# Patient Record
Sex: MALE | Race: WHITE
[De-identification: names, ages, dates, MRNs, and addresses within clinical notes are randomized per-mention and may not be internally consistent; named-entity substitution may affect disease eponyms.]

---

## 2018-06-26 ENCOUNTER — HOSPITAL ENCOUNTER (OUTPATIENT)
Dept: HOSPITAL 74 - FASU | Age: 58
LOS: 1 days | Discharge: HOME HEALTH | End: 2018-06-27
Attending: ORTHOPAEDIC SURGERY
Payer: COMMERCIAL

## 2018-06-26 VITALS — BODY MASS INDEX: 35.4 KG/M2

## 2018-06-26 DIAGNOSIS — M17.11: Primary | ICD-10-CM

## 2018-06-26 PROCEDURE — 27446 REVISION OF KNEE JOINT: CPT

## 2018-06-26 PROCEDURE — 8E0Y0CZ ROBOTIC ASSISTED PROCEDURE OF LOWER EXTREMITY, OPEN APPROACH: ICD-10-PCS | Performed by: ORTHOPAEDIC SURGERY

## 2018-06-26 PROCEDURE — 8E0YXBZ COMPUTER ASSISTED PROCEDURE OF LOWER EXTREMITY: ICD-10-PCS | Performed by: ORTHOPAEDIC SURGERY

## 2018-06-26 PROCEDURE — 0SRC0L9 REPLACEMENT OF RIGHT KNEE JOINT WITH MEDIAL UNICONDYLAR SYNTHETIC SUBSTITUTE, CEMENTED, OPEN APPROACH: ICD-10-PCS | Performed by: ORTHOPAEDIC SURGERY

## 2018-06-26 PROCEDURE — 20985 CPTR-ASST DIR MS PX: CPT

## 2018-06-26 RX ADMIN — CEFAZOLIN SODIUM SCH MLS/HR: 2 SOLUTION INTRAVENOUS at 23:16

## 2018-06-26 RX ADMIN — ACETAMINOPHEN SCH MG: 325 TABLET ORAL at 23:16

## 2018-06-26 NOTE — HP
[facility]





- Chief Complaint


Chief Complaint: right knee pain





- Past Medical History


Allergies/Adverse Reactions: 


 Allergies











Allergy/AdvReac Type Severity Reaction Status Date / Time


 


No Known Allergies Allergy   Verified 06/06/18 14:56














- Current Medications


Current Medications: 


 Home Medications











 Medication  Instructions  Recorded


 


Oxycodone HCl/Acetaminophen 1 each PO Q4H 06/06/18





[Percocet  mg Tablet]  














Satellite Physical Exam





- Physical Examination


General Appearance: Well Nourished, Well Developed, Alert & Oriented x3


ENT: Clear


Lung: Normal air movement


Heart: Regular rate & rhythm


Extremities: Other (right knee- + swelling, + ttp medially, decr rom, nvi xrays 

show grade 4 medial djd)


Neurological: Intact, Alert, Oriented





Satellite Impression/Plan





- Impression/Plan


Impression: right knee medial djd


Operative Procedure: right medial cindy ukr


Date to be Performed: 06/26/18

## 2018-06-26 NOTE — OP
Operative Note





- Note:


Operative Date: 06/26/18 (rae)


Pre-Operative Diagnosis: right knee medial djd


Operation: right medial cindy ukr


Post-Operative Diagnosis: Same as Pre-op


Surgeon: Mario Sr


Assistant: Viral Tobias)


Anesthesiologist/CRNA: Carolee Pruett


Anesthesia: Spinal, Local


Specimens Removed: bone fragments


Estimated Blood Loss (mls): 100


Operative Report Dictated: Yes

## 2018-06-26 NOTE — SPEC
DATE OF OPERATION:  06/26/2018

 

PREOPERATIVE DIAGNOSIS:  Degenerative joint disease, right knee.

 

POSTOPERATIVE DIAGNOSIS:  Degenerative joint disease, right knee.

 

PROCEDURE:  Right medial unicompartmental knee replacement with robotic-assisted

navigation (MAKOplasty) and patelloplasty.

 

SURGICAL ATTENDING:  Altaf Sr MD

 

FIRST ASSISTANT:  RONA Bob and Chepe Montejo MD

 

ANESTHESIA:  Regional and spinal.

 

CLOSURE:  Medial unicompartmental CHIOMA components with a 5 femur, 6 tibia, and a 9

polyethylene; No. 1 Vicryl, fascia; 0 and 2-0, subcutaneous; 3-0 Monocryl

subcuticular with skin glue; 4-0 undyed Vicryl for pin sites.

 

ESTIMATED BLOOD LOSS:  Less than 100 mL.

 

COMPLICATIONS:  None.

 

CONDITION:  To recovery in stable condition.

 

DESCRIPTION OF OPERATIVE PROCEDURE:  Patient was taken to the operating room on June 26, 2018.  Spinal and regional anesthesia was administered by the anesthesiologist. 

IV Kefzol and TXA were administered prophylactically prior to the case.  A

well-padded pneumatic tourniquet was placed on the right proximal thigh.   The right

lower extremity was prepped and draped in the usual sterile fashion.  A 6- to 8-cm

longitudinal incision over the medial side of the patella from mid patella to the

tibial tubercle was incised and was deepened using Bovie cautery.  An arthrotomy was

then made just medial to the patellar tendon and the patella.  Subperiosteal

dissection was done on the anteromedial proximal tibia all the way back to the MCL. 

Partial fat pad excision was performed, exposing the medial compartment.  Checkpoint

was malleable at both the femur and the tibia.  Using 2 stab incisions in the femur 1

handbreadth above the patella on the femur and 2 stab incisions 1 handbreadth below

the tibial tubercle on the tibia, 2 threaded pins were drilled in parallel fashion

from anterior to posterior, going through the proximal cortex and engaging the 2nd

but not through the 2nd cortex.  To these threaded pins were fastened navigation

rays, 1 on the femur and 1 on the tibia.  The knee was then registered with the

navigation device with the center of the rotation of the hip, medial and lateral

malleoli, and multiple points both on the femur and on the tibia.  Excellent

registration of less than 0.5 mm was obtained on both to ensure adequate

registration.  The navigation device ensured us to "pop the bubbles" both on the

femur and the tibia and that was performed and passed registration.

 

The knee was then thoroughly inspected to remove all osteophytes both on the femur

and the tibia.  Also, osteophytes on the trochlea and on the surface of the patella

were removed as well.  The knee was then stressed with valgus stress at 0, 30, 60,

90, and 120 degrees of flexion.  This propagated a looseness/tightness graft.  The

virtual positions of the components were then optimized to ensure an excellent graft.

 The tracking also was optimized by manipulating the virtual position to ensure that

the femoral component articulated with the central portion of the tibial component. 

The robot was then brought into the field and was registered.  The robot was used to

bur the bone on both the femur and the tibia as to the specifications of the

components.  The trial components were then applied on both the femur and the tibia

with an appropriate polyethylene insert.  The knee was taken through a range of

motion and found to have full extension, full flexion, with excellent stability. 

Stressing the graft revealed an excellent looseness/tightness graft with the trial

components in place.

 

The trial components were removed.  The knee was thoroughly irrigated with a copious

amount of antibiotic irrigation.  The real components were then cemented in using

modern generation cement techniques with antibiotic cement and pressurization.  After

the cement was hardened, the knee was thoroughly inspected to remove out all excess

cement.  The real polyethylene insert was then clipped into place.  Range of motion

and stability were again assessed to be as they were with the trials.  At this time,

the pins and the checkpoints were removed.  The knee was again thoroughly irrigated. 

The arthrotomy was closed with No. 1 Vicryl, 0 and 2-0 subcutaneous, and 3-0 Monocryl

subcuticular with skin glue for the skin, 4-0 undyed Vicryl for the pin sites. 

Sterile pressure dressing was placed over the knee.  Patient awakened from anesthesia

and transferred to recovery in stable condition.  No complications.  Estimated blood

loss negligible.  X-rays postoperatively revealed excellent position of the

components.

 

Chepe Montejo MD dictating for MD ALTAF Martino M.D. [id number]

DD: 06/26/2018 16:47

DT: 06/26/2018 18:12

Job #:  [id number]

## 2018-06-27 VITALS — SYSTOLIC BLOOD PRESSURE: 142 MMHG | TEMPERATURE: 98 F | DIASTOLIC BLOOD PRESSURE: 84 MMHG | HEART RATE: 88 BPM

## 2018-06-27 RX ADMIN — CEFAZOLIN SODIUM SCH MLS/HR: 2 SOLUTION INTRAVENOUS at 06:21

## 2018-06-27 RX ADMIN — ACETAMINOPHEN SCH MG: 325 TABLET ORAL at 06:21

## 2018-06-27 NOTE — DS
Physical Examination


Vital Signs: 


 Vital Signs











Temperature  98.0 F   06/27/18 05:39


 


Pulse Rate  88   06/27/18 05:39


 


Respiratory Rate  18   06/27/18 05:39


 


Blood Pressure  142/84   06/27/18 05:39


 


O2 Sat by Pulse Oximetry (%)  98   06/27/18 05:39














Discharge Summary


Reason For Visit: OSTEOARTHRITIS


Procedures: Principal: right medial cindy ukr


Hospital Course: 





admitted for elective right medial cindy ukr, uneventful post-op, stable for d/c


Condition: Good





- Instructions


Diet, Activity, Other Instructions: 


Post-op Instructions-Partial Knee Replacement                                  

                 





     Call the office for a follow-up  appointment in 1 week - [phone]


     Aspirin 325mg daily for 6 weeks. Pain medication was sent into your 

pharmacy.                      


     Apply  Graduated Compression Stockings (TEDs) to both lower extremities-

remove daily


      for hygiene  ONLY


     Apply Sequential Compression Device (SCDs)  to both Lower extremities 

remove for PT 


      and hygiene ONLY  


     Apply cold packs to affected area for 15 minutes every 2 hours.


     Physical Therapist will come to your home for the first 5 days. You will 

be set up with 


      outpatient PT at your first post-operative visit.


     Patient may ambulate as tolerated-encourage self care (at least every 2-3 

hours while awake) 


      with walker or cane


     Maintain Aquacel (waterproof) dressing to operative wound (will be 

removed by surgeon at 


      first office visit)


     Shower with Aquacel dressing in place-if Aquacel integrity compromised, 

remove and apply 


      dry sterile dressing and notify Orthopedist.  DO NOT SHOWER unless 

Orthopedists approves without Aquacel 


      dressing





     CONTACT THE OFFICE FOR ANY CHANGE IN YOUR CONDITION (for example-fever 


      greater than 102 degrees, excessive bleeding from operative site, 

purulent drainage, 


      severe swelling or pain)    GO TO THE EMERGENCY ROOM IF THERE IS A  

MEDICAL EMERGENCY





     Knee Precautions:  


      * Keep a rolled towel under affected heel while in bed or chair (to keep 

knee 


        in extension)


      * Keep affected leg elevated except during mealtimes


      * DO NOT PLACE PILLOW UNDER AFFECTED KNEE





  * If you have any questions, please do not hesitate to call the office - [phone].





Referrals: 


Chepe Montejo MD [Staff Physician] - 


Disposition: [facility]/HOME HEALTH CARE





- Home Medications


Comprehensive Discharge Medication List: 


Ambulatory Orders





Aspirin [ASA -] 325 mg PO DAILY@0800  tablet 06/26/18 


Oxycodone HCl/Acetaminophen [Percocet  mg Tablet] 1 each PO Q4H #50 tab 

MDD 6 06/26/18

## 2019-02-15 NOTE — PN
Progress Note (short form)





- Note


Progress Note: 





Ortho





Pt seen and examined s/p right medial cindy ukr pod #1


 Selected Entries











  06/27/18





  05:39


 


Temperature 98.0 F


 


Pulse Rate 88


 


Respiratory 18





Rate 


 


Blood Pressure 142/84








dressing c/d/i calf soft, nt


rom 0-80, nvi





a/p


PT


dvt ppx


pain control


d/c home today


f/u in 1 week English